# Patient Record
Sex: MALE | Race: WHITE | NOT HISPANIC OR LATINO | Employment: UNEMPLOYED | URBAN - NONMETROPOLITAN AREA
[De-identification: names, ages, dates, MRNs, and addresses within clinical notes are randomized per-mention and may not be internally consistent; named-entity substitution may affect disease eponyms.]

---

## 2019-07-11 ENCOUNTER — HOSPITAL ENCOUNTER (EMERGENCY)
Facility: HOSPITAL | Age: 29
Discharge: HOME | End: 2019-07-12
Attending: EMERGENCY MEDICINE | Admitting: EMERGENCY MEDICINE

## 2019-07-11 DIAGNOSIS — T50.902A INTENTIONAL DRUG OVERDOSE, INITIAL ENCOUNTER (HCC): Primary | ICD-10-CM

## 2019-07-11 DIAGNOSIS — R45.851 SUICIDAL IDEATION: ICD-10-CM

## 2019-07-11 LAB
A-A DO2: ABNORMAL MMHG
ALBUMIN SERPL-MCNC: 4.6 G/DL (ref 3.5–5)
ALBUMIN/GLOB SERPL: 1.4 G/DL (ref 1.1–2.5)
ALP SERPL-CCNC: 54 U/L (ref 24–120)
ALT SERPL W P-5'-P-CCNC: 24 U/L (ref 0–54)
AMPHET+METHAMPHET UR QL: NEGATIVE
ANION GAP SERPL CALCULATED.3IONS-SCNC: 8 MMOL/L (ref 4–13)
APAP SERPL-MCNC: <10 MCG/ML (ref 10–30)
ARTERIAL PATENCY WRIST A: POSITIVE
AST SERPL-CCNC: 32 U/L (ref 7–45)
ATMOSPHERIC PRESS: 748 MMHG
BARBITURATES UR QL SCN: NEGATIVE
BASE EXCESS BLDA CALC-SCNC: -0.6 MMOL/L (ref 0–2)
BASOPHILS # BLD AUTO: 0.01 10*3/MM3 (ref 0–0.2)
BASOPHILS NFR BLD AUTO: 0.1 % (ref 0–2)
BDY SITE: ABNORMAL
BENZODIAZ UR QL SCN: NEGATIVE
BILIRUB SERPL-MCNC: 0.6 MG/DL (ref 0.1–1)
BODY TEMPERATURE: 37 C
BUN BLD-MCNC: 19 MG/DL (ref 5–21)
BUN/CREAT SERPL: 23.2 (ref 7–25)
CALCIUM SPEC-SCNC: 9.3 MG/DL (ref 8.4–10.4)
CANNABINOIDS SERPL QL: NEGATIVE
CHLORIDE SERPL-SCNC: 106 MMOL/L (ref 98–110)
CO2 SERPL-SCNC: 24 MMOL/L (ref 24–31)
COCAINE UR QL: NEGATIVE
COHGB MFR BLD: 2.7 % (ref 0–5)
CREAT BLD-MCNC: 0.82 MG/DL (ref 0.5–1.4)
DEPRECATED RDW RBC AUTO: 39.8 FL (ref 40–54)
EOSINOPHIL # BLD AUTO: 0.08 10*3/MM3 (ref 0–0.7)
EOSINOPHIL NFR BLD AUTO: 0.7 % (ref 0–4)
ERYTHROCYTE [DISTWIDTH] IN BLOOD BY AUTOMATED COUNT: 12.4 % (ref 12–15)
ETHANOL UR QL: <0.01 %
GFR SERPL CREATININE-BSD FRML MDRD: 114 ML/MIN/1.73
GLOBULIN UR ELPH-MCNC: 3.3 GM/DL
GLUCOSE BLD-MCNC: 93 MG/DL (ref 70–100)
HCO3 BLDA-SCNC: 24.6 MMOL/L (ref 20–26)
HCT VFR BLD AUTO: 39 % (ref 40–52)
HCT VFR BLD CALC: 40.2 % (ref 38–51)
HGB BLD-MCNC: 13.2 G/DL (ref 14–18)
HGB BLDA-MCNC: 13.1 G/DL (ref 14–18)
HOLD SPECIMEN: NORMAL
HOLD SPECIMEN: NORMAL
IMM GRANULOCYTES # BLD AUTO: 0.02 10*3/MM3 (ref 0–0.05)
IMM GRANULOCYTES NFR BLD AUTO: 0.2 % (ref 0–5)
INR PPP: 0.94 (ref 0.91–1.09)
LYMPHOCYTES # BLD AUTO: 2.25 10*3/MM3 (ref 0.72–4.86)
LYMPHOCYTES NFR BLD AUTO: 20.2 % (ref 15–45)
Lab: ABNORMAL
MCH RBC QN AUTO: 29.9 PG (ref 28–32)
MCHC RBC AUTO-ENTMCNC: 33.8 G/DL (ref 33–36)
MCV RBC AUTO: 88.2 FL (ref 82–95)
METHADONE UR QL SCN: NEGATIVE
METHGB BLD QL: 1 % (ref 0–3)
MODALITY: ABNORMAL
MONOCYTES # BLD AUTO: 0.93 10*3/MM3 (ref 0.19–1.3)
MONOCYTES NFR BLD AUTO: 8.4 % (ref 4–12)
NEUTROPHILS # BLD AUTO: 7.84 10*3/MM3 (ref 1.87–8.4)
NEUTROPHILS NFR BLD AUTO: 70.4 % (ref 39–78)
NOTE: ABNORMAL
NRBC BLD AUTO-RTO: 0 /100 WBC (ref 0–0.2)
OPIATES UR QL: NEGATIVE
OXYHGB MFR BLDV: 93.5 % (ref 94–99)
PCO2 BLDA: 41.7 MM HG (ref 35–45)
PCO2 TEMP ADJ BLD: ABNORMAL MM HG (ref 35–45)
PCP UR QL SCN: NEGATIVE
PH BLDA: 7.38 PH UNITS (ref 7.35–7.45)
PH, TEMP CORRECTED: ABNORMAL PH UNITS (ref 7.35–7.45)
PLATELET # BLD AUTO: 246 10*3/MM3 (ref 130–400)
PMV BLD AUTO: 10.3 FL (ref 6–12)
PO2 BLDA: 87.8 MM HG (ref 83–108)
PO2 TEMP ADJ BLD: ABNORMAL MM HG (ref 83–108)
POTASSIUM BLD-SCNC: 4.2 MMOL/L (ref 3.5–5.3)
POTASSIUM BLDA-SCNC: 4 MMOL/L (ref 3.5–5.2)
PROT SERPL-MCNC: 7.9 G/DL (ref 6.3–8.7)
PROTHROMBIN TIME: 12.9 SECONDS (ref 11.9–14.6)
RBC # BLD AUTO: 4.42 10*6/MM3 (ref 4.8–5.9)
SALICYLATES SERPL-MCNC: <1 MG/DL (ref 15–30)
SAO2 % BLDCOA: 97.2 % (ref 94–99)
SODIUM BLD-SCNC: 138 MMOL/L (ref 135–145)
SODIUM BLDA-SCNC: 140 MMOL/L (ref 136–145)
TROPONIN I SERPL-MCNC: <0.012 NG/ML (ref 0–0.03)
VENTILATOR MODE: ABNORMAL
WBC NRBC COR # BLD: 11.13 10*3/MM3 (ref 4.8–10.8)
WHOLE BLOOD HOLD SPECIMEN: NORMAL
WHOLE BLOOD HOLD SPECIMEN: NORMAL

## 2019-07-11 PROCEDURE — 93010 ELECTROCARDIOGRAM REPORT: CPT | Performed by: INTERNAL MEDICINE

## 2019-07-11 PROCEDURE — 80307 DRUG TEST PRSMV CHEM ANLYZR: CPT | Performed by: NURSE PRACTITIONER

## 2019-07-11 PROCEDURE — 80053 COMPREHEN METABOLIC PANEL: CPT | Performed by: NURSE PRACTITIONER

## 2019-07-11 PROCEDURE — 82375 ASSAY CARBOXYHB QUANT: CPT

## 2019-07-11 PROCEDURE — 82805 BLOOD GASES W/O2 SATURATION: CPT

## 2019-07-11 PROCEDURE — 83050 HGB METHEMOGLOBIN QUAN: CPT

## 2019-07-11 PROCEDURE — 85610 PROTHROMBIN TIME: CPT | Performed by: NURSE PRACTITIONER

## 2019-07-11 PROCEDURE — 85025 COMPLETE CBC W/AUTO DIFF WBC: CPT | Performed by: NURSE PRACTITIONER

## 2019-07-11 PROCEDURE — 80307 DRUG TEST PRSMV CHEM ANLYZR: CPT | Performed by: EMERGENCY MEDICINE

## 2019-07-11 PROCEDURE — 84484 ASSAY OF TROPONIN QUANT: CPT | Performed by: NURSE PRACTITIONER

## 2019-07-11 PROCEDURE — 93005 ELECTROCARDIOGRAM TRACING: CPT | Performed by: NURSE PRACTITIONER

## 2019-07-11 PROCEDURE — 99284 EMERGENCY DEPT VISIT MOD MDM: CPT

## 2019-07-11 PROCEDURE — 36600 WITHDRAWAL OF ARTERIAL BLOOD: CPT

## 2019-07-11 RX ADMIN — SODIUM CHLORIDE 1000 ML: 9 INJECTION, SOLUTION INTRAVENOUS at 21:30

## 2019-07-12 VITALS
WEIGHT: 182 LBS | HEART RATE: 80 BPM | DIASTOLIC BLOOD PRESSURE: 70 MMHG | BODY MASS INDEX: 26.05 KG/M2 | TEMPERATURE: 98 F | RESPIRATION RATE: 16 BRPM | SYSTOLIC BLOOD PRESSURE: 120 MMHG | OXYGEN SATURATION: 99 % | HEIGHT: 70 IN

## 2019-07-12 NOTE — ED NOTES
Poison center contacted at this time, spoke with Jessica Eden, watch for hallucinations, seizures, CNS resp depression, urinary retention. Fluids and benzos if needed.      Fernanda Rivera RN  07/11/19 5664

## 2019-07-12 NOTE — ED PROVIDER NOTES
"Subjective   pt is a 28-year-old white male presents to the emergency department planing of suicidal ideations.  Patient states that he feels hopeless.  Patient states that he was a patient at [facility] which was court ordered.  He states he has been there for 2 weeks.  He states that he decided to leave the facility today because he felt anxious and hopeless.  He states that he just wanted to \"drink cough syrup until he was in a coma.\"  Patient states that he left [place] around 10:00 this morning and drank 2 bottles of over-the-counter dextromethorphan around noon today.  He states he was going to get some more until he \"killed himself.\"  Patient states that he does have a history of bipolar disorder and was on BuSpar, Zyprexa, and Elavil.  He states that before he left [facility] that he had to be taken off of those medications before he can go to drug rehab because those medicines \"were prohibited.\"  He denies using any other drugs today.        History provided by:  Patient   used: No        Review of Systems   Constitutional: Negative.    HENT: Negative.    Eyes: Negative.    Respiratory: Negative.    Cardiovascular: Negative.    Gastrointestinal: Negative.    Endocrine: Negative.    Genitourinary: Negative.    Musculoskeletal: Negative.    Skin: Negative.    Allergic/Immunologic: Negative.    Neurological: Negative.    Hematological: Negative.    Psychiatric/Behavioral:        Pt is a 28-year-old white male presents to the emergency department planing of suicidal ideations.  Patient states that he feels hopeless.  Patient states that he was a patient at [facility] which was court ordered.  He states he has been there for 2 weeks.  He states that he decided to leave the facility today because he felt anxious and hopeless.  He states that he just wanted to \"drink cough syrup until he was in a coma.\"  Patient states " "that he left [place] around 10:00 this morning and drank 2 bottles of over-the-counter dextromethorphan around noon today.  He states he was going to get some more until he \"killed himself.\"  Patient states that he does have a history of bipolar disorder and was on BuSpar, Zyprexa, and Elavil.  He states that before he left [facility] that he had to be taken off of those medications before he can go to drug rehab because those medicines \"were prohibited.\"  He denies using any other drugs today.     All other systems reviewed and are negative.      Past Medical History:   Diagnosis Date   • Anxiety    • Bipolar 1 disorder (CMS/HCC)    • Substance abuse (CMS/HCC)        No Known Allergies    Past Surgical History:   Procedure Laterality Date   • MANDIBLE FRACTURE SURGERY         History reviewed. No pertinent family history.    Social History     Socioeconomic History   • Marital status: Single     Spouse name: Not on file   • Number of children: Not on file   • Years of education: Not on file   • Highest education level: Not on file   Tobacco Use   • Smoking status: Current Every Day Smoker     Packs/day: 0.50     Types: Cigarettes   • Smokeless tobacco: Never Used   Substance and Sexual Activity   • Alcohol use: No     Frequency: Never   • Drug use: Yes     Comment: cough syrup    • Sexual activity: Defer       Prior to Admission medications    Not on File       /66   Pulse 87   Temp 98.8 °F (37.1 °C) (Oral)   Resp 19   Ht 177.8 cm (70\")   Wt 82.6 kg (182 lb)   SpO2 97%   BMI 26.11 kg/m²     Objective   Physical Exam   Constitutional: He is oriented to person, place, and time. He appears well-developed and well-nourished.   HENT:   Head: Normocephalic and atraumatic.   Eyes: Conjunctivae and EOM are normal. Pupils are equal, round, and reactive to light.   Neck: Normal range of motion. Neck supple.   Cardiovascular: Normal rate, regular rhythm, normal heart sounds and intact distal " "pulses.   Pulmonary/Chest: Effort normal and breath sounds normal.   Abdominal: Soft. Bowel sounds are normal.   Musculoskeletal: Normal range of motion.   Neurological: He is alert and oriented to person, place, and time. He has normal reflexes.   Skin: Skin is warm and dry.   Psychiatric:   Pt very anxious. Changes subject often during assessment.\" states \" I just don't have anything to live for\"    Nursing note and vitals reviewed.      Procedures         Lab Results (last 24 hours)     Procedure Component Value Units Date/Time    CBC & Differential [[id number]] Collected:  07/11/19 2050    Specimen:  Blood Updated:  07/11/19 2102    Narrative:       The following orders were created for panel order CBC & Differential.  Procedure                               Abnormality         Status                     ---------                               -----------         ------                     CBC Auto Differential[[id number]]        Abnormal            Final result                 Please view results for these tests on the individual orders.    Comprehensive Metabolic Panel [[id number]] Collected:  07/11/19 2050    Specimen:  Blood Updated:  07/11/19 2111     Glucose 93 mg/dL      BUN 19 mg/dL      Creatinine 0.82 mg/dL      Sodium 138 mmol/L      Potassium 4.2 mmol/L      Chloride 106 mmol/L      CO2 24.0 mmol/L      Calcium 9.3 mg/dL      Total Protein 7.9 g/dL      Albumin 4.60 g/dL      ALT (SGPT) 24 U/L      AST (SGOT) 32 U/L      Alkaline Phosphatase 54 U/L      Total Bilirubin 0.6 mg/dL      eGFR Non African Amer 114 mL/min/1.73      Globulin 3.3 gm/dL      A/G Ratio 1.4 g/dL      BUN/Creatinine Ratio 23.2     Anion Gap 8.0 mmol/L     Narrative:       GFR Normal >60  Chronic Kidney Disease <60  Kidney Failure <15    Protime-INR [[id number]]  (Normal) Collected:  07/11/19 2050    Specimen:  Blood Updated:  07/11/19 2114     Protime 12.9 Seconds      INR 0.94    Troponin [[id number]]  (Normal) Collected:  07/11/19 " 2050    Specimen:  Blood Updated:  07/11/19 2122     Troponin I <0.012 ng/mL     Acetaminophen Level [[id number]]  (Abnormal) Collected:  07/11/19 2050    Specimen:  Blood Updated:  07/11/19 2120     Acetaminophen <10.0 mcg/mL     Salicylate Level [[id number]]  (Abnormal) Collected:  07/11/19 2050    Specimen:  Blood Updated:  07/11/19 2120     Salicylate <1.0 mg/dL     CBC Auto Differential [[id number]]  (Abnormal) Collected:  07/11/19 2050    Specimen:  Blood Updated:  07/11/19 2102     WBC 11.13 10*3/mm3      RBC 4.42 10*6/mm3      Hemoglobin 13.2 g/dL      Hematocrit 39.0 %      MCV 88.2 fL      MCH 29.9 pg      MCHC 33.8 g/dL      RDW 12.4 %      RDW-SD 39.8 fl      MPV 10.3 fL      Platelets 246 10*3/mm3      Neutrophil % 70.4 %      Lymphocyte % 20.2 %      Monocyte % 8.4 %      Eosinophil % 0.7 %      Basophil % 0.1 %      Immature Grans % 0.2 %      Neutrophils, Absolute 7.84 10*3/mm3      Lymphocytes, Absolute 2.25 10*3/mm3      Monocytes, Absolute 0.93 10*3/mm3      Eosinophils, Absolute 0.08 10*3/mm3      Basophils, Absolute 0.01 10*3/mm3      Immature Grans, Absolute 0.02 10*3/mm3      nRBC 0.0 /100 WBC     Ethanol [[id number]]  (Normal) Collected:  07/11/19 2050    Specimen:  Blood Updated:  07/11/19 2153     Ethanol % <0.010 %     Narrative:       Not for legal purposes. Chain of Custody not followed.     Blood Gas, Arterial With Co-Ox [[id number]]  (Abnormal) Collected:  07/11/19 2100    Specimen:  Arterial Blood Updated:  07/11/19 2108     Site Right Radial     Yasir's Test Positive     pH, Arterial 7.380 pH units      pCO2, Arterial 41.7 mm Hg      pO2, Arterial 87.8 mm Hg      HCO3, Arterial 24.6 mmol/L      Base Excess, Arterial -0.6 mmol/L      Comment: 84 Value below reference range        O2 Saturation, Arterial 97.2 %      Hemoglobin, Blood Gas 13.1 g/dL      Comment: 84 Value below reference range        Hematocrit, Blood Gas 40.2 %      Oxyhemoglobin 93.5 %      Comment: 84 Value below reference  range        Methemoglobin 1.00 %      Carboxyhemoglobin 2.7 %      A-a Gradiant -- mmHg      Comment: UNABLE TO CALCULATE        Temperature 37.0 C      Sodium, Arterial 140 mmol/L      Potassium, Arterial 4.0 mmol/L      Barometric Pressure for Blood Gas 748 mmHg      Modality Room Air     Ventilator Mode NA     Note --     Collected by [id number]     Comment: Meter: [id number]     :  [id number]        pH, Temp Corrected -- pH Units      pCO2, Temperature Corrected -- mm Hg      pO2, Temperature Corrected -- mm Hg     Urine Drug Screen - Urine, Clean Catch [[id number]]  (Normal) Collected:  07/11/19 2245    Specimen:  Urine, Clean Catch Updated:  07/11/19 2321     Amphetamine Screen, Urine Negative     Barbiturates Screen, Urine Negative     Benzodiazepine Screen, Urine Negative     Cocaine Screen, Urine Negative     Methadone Screen, Urine Negative     Opiate Screen Negative     Phencyclidine (PCP), Urine Negative     THC, Screen, Urine Negative    Narrative:       Negative Thresholds For Drugs Screened in Urine:    Amphetamines          500 ng/ml  Barbiturates          200 ng/ml  Benzodiazepines       200 ng/ml  Cocaine               150 ng/ml  Methadone             150 ng/ml  Opiates               300 ng/ml  Phencyclidine         25 ng/ml  THC                      50 ng/ml    The normal value for all drugs tested is negative. This report includes final unconfirmed screening results.  A positive result by this assay can be, at your request, sent to the Reference Lab for confirmation by gas chromatography. Unconfirmed results must not be used for non-medical purposes, such as employment or legal testing. Clinical consideration should be applied to any drug of abuse test result, particularly when unconfirmed results are used.          No orders to display       ED Course  ED Course as of Jul 12 1643   Thu Jul 11, 2019   [id number] Nursing staff have spoken with poison control.  Advised supportive measures after  dextromethorphan overdose. Advised to continue with ivfluids and monitor for neuro changes and seizures.    [CW]   2536 Reviewed pt and in care plan with dr dyer- also in agreement with pt care plan. Pending [facility] consult at this time   [CW]      ED Course User Index  [CW] RadhaDia, LALITO   4 [facility] was consulted.  Patient was initially going to be sent for inpatient treatment.  They were unsuccessful with placement.  Throughout that time, patient retracted his suicidal statements and states he only said that because he was drinking cough syrup.  Patient states he feels better now and he denies any suicidal or homicidal thoughts.  4 [facility] reevaluated the patient and patient was cleared for discharge.  He will follow-up as an outpatient.  He is to return to the ER immediately for any suicidal or homicidal thoughts or other concerns.  Patient agreeable.         MDM    Final diagnoses:   Intentional drug overdose, initial encounter (CMS/[facility])   Suicidal ideation          Anjali Zuniga MD  07/12/19 8704

## 2019-07-12 NOTE — ED NOTES
I called [facility] to see if they are still reviewing the case. They are still in the process of looking at the case, that they are full and will call us back to let us know if there will be any discharges.      Wil Plummer RN  07/12/19 0753

## 2019-07-12 NOTE — ED NOTES
Pt states he relapsed today when leaving the rehab facility        Michael Sandoval RN  07/11/19 2036

## 2019-07-12 NOTE — ED NOTES
I have called [facility] to come back out to re-evaluate pt and discuss possible placement somewhere else.      Wil Plummer RN  07/12/19 0877

## 2019-07-12 NOTE — ED NOTES
Pt called out and asked to speak with a nurse. The pt states he is no longer suicidal, and would like to go home. I explained that we have to have him cleared by [facility] before that can happen. He said that he did all of this to see someone from [facility] faster to get his medications. I told this to Dr. Zuniga, who went back in to talk to the pt.      Wil Plummer, RN  07/12/19 0274

## 2019-07-17 ENCOUNTER — HOSPITAL ENCOUNTER (EMERGENCY)
Facility: HOSPITAL | Age: 29
Discharge: HOME | End: 2019-07-17
Attending: EMERGENCY MEDICINE | Admitting: FAMILY MEDICINE

## 2019-07-17 VITALS
TEMPERATURE: 98 F | DIASTOLIC BLOOD PRESSURE: 73 MMHG | BODY MASS INDEX: 26.05 KG/M2 | SYSTOLIC BLOOD PRESSURE: 150 MMHG | OXYGEN SATURATION: 97 % | HEIGHT: 70 IN | HEART RATE: 97 BPM | RESPIRATION RATE: 16 BRPM | WEIGHT: 182 LBS

## 2019-07-17 DIAGNOSIS — T65.94XA INGESTION OF NONTOXIC SUBSTANCE, UNDETERMINED INTENT, INITIAL ENCOUNTER: Primary | ICD-10-CM

## 2019-07-17 LAB
ALBUMIN SERPL-MCNC: 4.2 G/DL (ref 3.5–5)
ALBUMIN/GLOB SERPL: 1.4 G/DL (ref 1.1–2.5)
ALP SERPL-CCNC: 54 U/L (ref 24–120)
ALT SERPL W P-5'-P-CCNC: 45 U/L (ref 0–54)
AMPHET+METHAMPHET UR QL: NEGATIVE
ANION GAP SERPL CALCULATED.3IONS-SCNC: 6 MMOL/L (ref 4–13)
APAP SERPL-MCNC: <10 MCG/ML (ref 10–30)
AST SERPL-CCNC: 60 U/L (ref 7–45)
BARBITURATES UR QL SCN: NEGATIVE
BASOPHILS # BLD AUTO: 0.05 10*3/MM3 (ref 0–0.2)
BASOPHILS NFR BLD AUTO: 0.4 % (ref 0–2)
BENZODIAZ UR QL SCN: NEGATIVE
BILIRUB SERPL-MCNC: 0.7 MG/DL (ref 0.1–1)
BUN BLD-MCNC: 20 MG/DL (ref 5–21)
BUN/CREAT SERPL: 20.8 (ref 7–25)
CALCIUM SPEC-SCNC: 8.5 MG/DL (ref 8.4–10.4)
CANNABINOIDS SERPL QL: NEGATIVE
CHLORIDE SERPL-SCNC: 105 MMOL/L (ref 98–110)
CO2 SERPL-SCNC: 27 MMOL/L (ref 24–31)
COCAINE UR QL: NEGATIVE
CREAT BLD-MCNC: 0.96 MG/DL (ref 0.5–1.4)
DEPRECATED RDW RBC AUTO: 39.8 FL (ref 40–54)
EOSINOPHIL # BLD AUTO: 0.14 10*3/MM3 (ref 0–0.7)
EOSINOPHIL NFR BLD AUTO: 1.2 % (ref 0–4)
ERYTHROCYTE [DISTWIDTH] IN BLOOD BY AUTOMATED COUNT: 12.4 % (ref 12–15)
ETHANOL UR QL: <0.01 %
GFR SERPL CREATININE-BSD FRML MDRD: 95 ML/MIN/1.73
GLOBULIN UR ELPH-MCNC: 3 GM/DL
GLUCOSE BLD-MCNC: 134 MG/DL (ref 70–100)
HCT VFR BLD AUTO: 36.7 % (ref 40–52)
HGB BLD-MCNC: 12.4 G/DL (ref 14–18)
IMM GRANULOCYTES # BLD AUTO: 0.04 10*3/MM3 (ref 0–0.05)
IMM GRANULOCYTES NFR BLD AUTO: 0.4 % (ref 0–5)
LYMPHOCYTES # BLD AUTO: 2.34 10*3/MM3 (ref 0.72–4.86)
LYMPHOCYTES NFR BLD AUTO: 20.5 % (ref 15–45)
MCH RBC QN AUTO: 29.9 PG (ref 28–32)
MCHC RBC AUTO-ENTMCNC: 33.8 G/DL (ref 33–36)
MCV RBC AUTO: 88.4 FL (ref 82–95)
METHADONE UR QL SCN: NEGATIVE
MONOCYTES # BLD AUTO: 0.93 10*3/MM3 (ref 0.19–1.3)
MONOCYTES NFR BLD AUTO: 8.2 % (ref 4–12)
NEUTROPHILS # BLD AUTO: 7.91 10*3/MM3 (ref 1.87–8.4)
NEUTROPHILS NFR BLD AUTO: 69.3 % (ref 39–78)
NRBC BLD AUTO-RTO: 0 /100 WBC (ref 0–0.2)
OPIATES UR QL: NEGATIVE
PCP UR QL SCN: POSITIVE
PLATELET # BLD AUTO: 259 10*3/MM3 (ref 130–400)
PMV BLD AUTO: 10 FL (ref 6–12)
POTASSIUM BLD-SCNC: 3.7 MMOL/L (ref 3.5–5.3)
PROT SERPL-MCNC: 7.2 G/DL (ref 6.3–8.7)
RBC # BLD AUTO: 4.15 10*6/MM3 (ref 4.8–5.9)
SALICYLATES SERPL-MCNC: <1 MG/DL (ref 15–30)
SODIUM BLD-SCNC: 138 MMOL/L (ref 135–145)
WBC NRBC COR # BLD: 11.41 10*3/MM3 (ref 4.8–10.8)

## 2019-07-17 PROCEDURE — 80307 DRUG TEST PRSMV CHEM ANLYZR: CPT | Performed by: EMERGENCY MEDICINE

## 2019-07-17 PROCEDURE — 80053 COMPREHEN METABOLIC PANEL: CPT | Performed by: EMERGENCY MEDICINE

## 2019-07-17 PROCEDURE — 99283 EMERGENCY DEPT VISIT LOW MDM: CPT

## 2019-07-17 PROCEDURE — 85025 COMPLETE CBC W/AUTO DIFF WBC: CPT | Performed by: EMERGENCY MEDICINE

## 2019-07-19 ENCOUNTER — APPOINTMENT (OUTPATIENT)
Dept: CT IMAGING | Facility: HOSPITAL | Age: 29
End: 2019-07-19

## 2019-07-19 ENCOUNTER — HOSPITAL ENCOUNTER (EMERGENCY)
Facility: HOSPITAL | Age: 29
Discharge: HOME | End: 2019-07-19
Attending: EMERGENCY MEDICINE | Admitting: EMERGENCY MEDICINE

## 2019-07-19 VITALS
HEIGHT: 70 IN | RESPIRATION RATE: 17 BRPM | BODY MASS INDEX: 25.48 KG/M2 | TEMPERATURE: 98.4 F | WEIGHT: 178 LBS | SYSTOLIC BLOOD PRESSURE: 149 MMHG | OXYGEN SATURATION: 98 % | DIASTOLIC BLOOD PRESSURE: 90 MMHG | HEART RATE: 88 BPM

## 2019-07-19 DIAGNOSIS — S80.819A ABRASION OF LOWER EXTREMITY, UNSPECIFIED LATERALITY, INITIAL ENCOUNTER: ICD-10-CM

## 2019-07-19 DIAGNOSIS — S00.83XA CONTUSION OF FACE, INITIAL ENCOUNTER: Primary | ICD-10-CM

## 2019-07-19 DIAGNOSIS — S00.81XA ABRASION OF FACE, INITIAL ENCOUNTER: ICD-10-CM

## 2019-07-19 LAB — ETHANOL UR QL: <0.01 %

## 2019-07-19 PROCEDURE — 80307 DRUG TEST PRSMV CHEM ANLYZR: CPT | Performed by: EMERGENCY MEDICINE

## 2019-07-19 PROCEDURE — 70450 CT HEAD/BRAIN W/O DYE: CPT

## 2019-07-19 PROCEDURE — 36415 COLL VENOUS BLD VENIPUNCTURE: CPT

## 2019-07-19 PROCEDURE — 72125 CT NECK SPINE W/O DYE: CPT

## 2019-07-19 PROCEDURE — 99283 EMERGENCY DEPT VISIT LOW MDM: CPT

## 2019-07-19 PROCEDURE — 70486 CT MAXILLOFACIAL W/O DYE: CPT
